# Patient Record
Sex: FEMALE | Race: WHITE
[De-identification: names, ages, dates, MRNs, and addresses within clinical notes are randomized per-mention and may not be internally consistent; named-entity substitution may affect disease eponyms.]

---

## 2017-02-15 ENCOUNTER — HOSPITAL ENCOUNTER (OUTPATIENT)
Dept: HOSPITAL 45 - C.ACU | Age: 37
Discharge: HOME | End: 2017-02-15
Attending: OBSTETRICS & GYNECOLOGY
Payer: COMMERCIAL

## 2017-02-15 DIAGNOSIS — M41.20: Primary | ICD-10-CM

## 2017-02-15 NOTE — ANESTHESIOLOGY PROGRESS NOTE
Anesthesia Progress Note


Date of Service


Feb 15, 2017.





Progress Notes


Pt seen in PAT for Exam and Advise: OB visit. Pt's EDC: 3/18/17. First 

pregnancy. PMH significant for: scoliosis, hypothyroid (found during pregnancy; 

pt refusing to take Synthroid per OB notes), hyperemesis gravidarum (on Zofran)

, GERD (on Zantac). Pt states has worn a back brace previously for scoliosis. 

No hx of surgical intervention. Pt here to discuss options for pain control 

with delivery. Options discussed including IV medications and epidural. Pt does 

have fairly significant scoliosis. D/w pt that epidural may be more difficult 

to place; possibly to use US guidance. Pt states she does have a print-out of 

spine imaging, but she is in the process of moving so she is unable to locate 

the image at this time. Advised her to bring if she is able to find.

## 2017-03-18 ENCOUNTER — HOSPITAL ENCOUNTER (INPATIENT)
Dept: HOSPITAL 45 - C.LD | Age: 37
LOS: 10 days | Discharge: HOME | End: 2017-03-28
Attending: OBSTETRICS & GYNECOLOGY | Admitting: OBSTETRICS & GYNECOLOGY
Payer: COMMERCIAL

## 2017-03-18 VITALS
HEIGHT: 65.98 IN | BODY MASS INDEX: 22.85 KG/M2 | HEIGHT: 65.98 IN | WEIGHT: 142.2 LBS | WEIGHT: 142.2 LBS | BODY MASS INDEX: 22.85 KG/M2

## 2017-03-18 DIAGNOSIS — O48.0: Primary | ICD-10-CM

## 2017-03-18 DIAGNOSIS — O61.0: ICD-10-CM

## 2017-03-18 DIAGNOSIS — K66.0: ICD-10-CM

## 2017-03-18 DIAGNOSIS — O99.613: ICD-10-CM

## 2017-03-18 DIAGNOSIS — Z3A.40: ICD-10-CM

## 2017-03-18 DIAGNOSIS — O32.3XX0: ICD-10-CM

## 2017-03-24 LAB
EOSINOPHIL NFR BLD AUTO: 214 K/UL (ref 130–400)
HCT VFR BLD CALC: 35.1 % (ref 37–47)
MCH RBC QN AUTO: 28.5 PG (ref 25–34)
MCHC RBC AUTO-ENTMCNC: 32.8 G/DL (ref 32–36)
MCV RBC AUTO: 86.9 FL (ref 80–100)
NRBC BLD AUTO-RTO: 0.3 %
PMV BLD AUTO: 9.3 FL (ref 7.4–10.4)
RBC # BLD AUTO: 4.04 M/UL (ref 4.2–5.4)
WBC # BLD AUTO: 11.09 K/UL (ref 4.8–10.8)

## 2017-03-24 NOTE — HISTORY & PHYSICAL EXAMINATION
DATE OF ADMISSION:  03/24/2017

 

HISTORY OF PRESENT ILLNESS:  The patient is a 36-year-old G1, P0, due date

03/19/2017, making her 40 weeks and 5 days today who is here for induction of

labor for postdates.  The patient's pregnancy has been unremarkable.  She was

pregnant via IVF and was seen by maternal fetal medicine because of advanced

maternal age.  On arrival to labor and delivery, she had no shortness of

breath, no chills, no fever.  Fetal heart rate is category 1.

 

PRENATAL COURSE:  Unremarkable.

 

PRENATAL LABS:  Blood type A positive, antibody negative, rubella immune, GBS

negative, RPR nonreactive.

 

PAST MEDICAL HISTORY:

1.  History of migraines.

2.  History of scoliosis.

3.  History of hypothyroidism.

 

PAST SURGICAL HISTORY:  The patient has had laparoscopy.

 

SOCIAL HISTORY:  The patient is .  Denies drug, tobacco or alcohol

use.

 

ALLERGIES:  No known drug allergies.

 

MEDICATIONS:  The patient was on Zantac, prenatal vitamins and iron

supplement.

 

PHYSICAL EXAMINATION:

GENERAL:  Well-developed, well-nourished white female in no acute distress.

HEART:  S1, S2, regular rhythm and rate.

LUNGS:  Clear to auscultation bilaterally.

ABDOMEN:  Nontender, Gravid.  Bedside ultrasound shows cephalic presentation.

PELVIC:  The patient was fingertip, 50% effaced and minus 3 on admission.

EXTREMITIES:  No cyanosis, clubbing or edema.

 

ASSESSMENT AND PLAN:  A 36-year-old G1, P0 at 40 weeks and 5 days, here for

labor induction for postdates.  The patient is admitted and will start

induction of labor.

## 2017-03-25 LAB
COMPLETE: YES
EOSINOPHIL NFR BLD AUTO: 199 K/UL (ref 130–400)
HCT VFR BLD CALC: 32.7 % (ref 37–47)
LYMPHOCYTES # BLD: 0.98 K/UL (ref 1.2–3.4)
LYMPHOCYTES NFR BLD: 6.1 %
MCH RBC QN AUTO: 28.9 PG (ref 25–34)
MCHC RBC AUTO-ENTMCNC: 33.6 G/DL (ref 32–36)
MCV RBC AUTO: 85.8 FL (ref 80–100)
META ABS #: 0.14 K/UL (ref 0–0)
METAMYELOCYTES NFR BLD: 0.9 %
MYELOCYTES NFR BLD: 0.9 %
NEUTROPHILS NFR BLD AUTO: 83.4 %
PMV BLD AUTO: 9.5 FL (ref 7.4–10.4)
RBC # BLD AUTO: 3.81 M/UL (ref 4.2–5.4)
WBC # BLD AUTO: 16.04 K/UL (ref 4.8–10.8)

## 2017-03-25 PROCEDURE — 0UN90ZZ RELEASE UTERUS, OPEN APPROACH: ICD-10-PCS | Performed by: OBSTETRICS & GYNECOLOGY

## 2017-03-25 RX ADMIN — ROPIVACAINE HYDROCHLORIDE PRN ML: 2 INJECTION, SOLUTION EPIDURAL; INFILTRATION at 10:53

## 2017-03-25 RX ADMIN — SODIUM CHLORIDE, SODIUM LACTATE, POTASSIUM CHLORIDE, AND CALCIUM CHLORIDE SCH MLS/HR: 600; 310; 30; 20 INJECTION, SOLUTION INTRAVENOUS at 03:26

## 2017-03-25 RX ADMIN — ROPIVACAINE HYDROCHLORIDE PRN ML: 2 INJECTION, SOLUTION EPIDURAL; INFILTRATION at 18:56

## 2017-03-25 RX ADMIN — SODIUM CHLORIDE, SODIUM LACTATE, POTASSIUM CHLORIDE, AND CALCIUM CHLORIDE SCH MLS/HR: 600; 310; 30; 20 INJECTION, SOLUTION INTRAVENOUS at 10:51

## 2017-03-25 RX ADMIN — OXYTOCIN SCH MLS/HR: 10 INJECTION, SOLUTION INTRAMUSCULAR; INTRAVENOUS at 22:54

## 2017-03-25 RX ADMIN — EPHEDRINE SULFATE PRN MG: 50 INJECTION INTRAVENOUS at 09:14

## 2017-03-25 RX ADMIN — EPHEDRINE SULFATE PRN MG: 50 INJECTION INTRAVENOUS at 09:21

## 2017-03-25 RX ADMIN — SODIUM CHLORIDE, SODIUM LACTATE, POTASSIUM CHLORIDE, AND CALCIUM CHLORIDE SCH MLS/HR: 600; 310; 30; 20 INJECTION, SOLUTION INTRAVENOUS at 01:42

## 2017-03-25 RX ADMIN — ROPIVACAINE HYDROCHLORIDE PRN ML: 2 INJECTION, SOLUTION EPIDURAL; INFILTRATION at 18:28

## 2017-03-25 NOTE — ANESTHESIOLOGY PROGRESS NOTE
Anesthesia Post Op Note


Date & Time


Mar 25, 2017 at 21:30





Vital Signs


Pain Intensity:  5.0





Notes


Mental Status:  alert / awake / arousable, participated in evaluation


Pt Amnestic to Procedure:  Yes


Nausea / Vomiting:  adequately controlled


Pain:  adequately controlled


Airway Patency, RR, SpO2:  stable & adequate


BP & HR:  stable & adequate


Hydration State:  stable & adequate


Anesthetic Complications:  no major complications apparent

## 2017-03-25 NOTE — [facility] POST OPERATIVE BRIEF NOTE
Immediate Operative Summary


Operative Date


Mar 25, 2017.





Pre-Operative Diagnosis





IUP; Brow Presentation; Failed induction





Post-Operative Diagnosis





Same





Procedure(s) Performed





Primary caesarean section





Delivery of live female child at 2037





Lower uterine transverse incision, lysis of filmy adhesions and repair of


bilateral broad ligaments





Surgeon


Dr. Vargas





Assistant Surgeon(s)


Dr. Guadalupe





Estimated Blood Loss


500cc





Findings


Baby 's neck hyperflexed, brow presentation, Apgars 9/9, female





Fluids (cc crystalloids)


1000 ml lr





Specimens





Cord blood





Placenta-exam





Drains


400 cc urine





Anesthesia


Spinal





Complication(s)


None





Disposition


L&D

## 2017-03-26 VITALS — HEART RATE: 85 BPM | DIASTOLIC BLOOD PRESSURE: 67 MMHG | SYSTOLIC BLOOD PRESSURE: 107 MMHG | TEMPERATURE: 98.42 F

## 2017-03-26 VITALS
SYSTOLIC BLOOD PRESSURE: 102 MMHG | DIASTOLIC BLOOD PRESSURE: 59 MMHG | OXYGEN SATURATION: 95 % | TEMPERATURE: 98.24 F | HEART RATE: 79 BPM

## 2017-03-26 VITALS — OXYGEN SATURATION: 96 %

## 2017-03-26 VITALS — OXYGEN SATURATION: 98 %

## 2017-03-26 VITALS — OXYGEN SATURATION: 93 %

## 2017-03-26 VITALS — SYSTOLIC BLOOD PRESSURE: 94 MMHG | TEMPERATURE: 99.14 F | HEART RATE: 98 BPM | DIASTOLIC BLOOD PRESSURE: 59 MMHG

## 2017-03-26 VITALS
SYSTOLIC BLOOD PRESSURE: 96 MMHG | DIASTOLIC BLOOD PRESSURE: 43 MMHG | TEMPERATURE: 98.42 F | OXYGEN SATURATION: 98 % | HEART RATE: 80 BPM

## 2017-03-26 VITALS — TEMPERATURE: 99.68 F | SYSTOLIC BLOOD PRESSURE: 105 MMHG | DIASTOLIC BLOOD PRESSURE: 68 MMHG | HEART RATE: 84 BPM

## 2017-03-26 VITALS
OXYGEN SATURATION: 97 % | HEART RATE: 88 BPM | DIASTOLIC BLOOD PRESSURE: 73 MMHG | SYSTOLIC BLOOD PRESSURE: 118 MMHG | TEMPERATURE: 98.6 F

## 2017-03-26 VITALS
DIASTOLIC BLOOD PRESSURE: 58 MMHG | HEART RATE: 93 BPM | SYSTOLIC BLOOD PRESSURE: 99 MMHG | OXYGEN SATURATION: 99 % | TEMPERATURE: 97.88 F

## 2017-03-26 VITALS — OXYGEN SATURATION: 99 %

## 2017-03-26 VITALS — OXYGEN SATURATION: 97 %

## 2017-03-26 LAB
APPEARANCE UR: CLEAR
BASOPHILS # BLD: 0.01 K/UL (ref 0–0.2)
BASOPHILS NFR BLD: 0.1 %
BILIRUB UR-MCNC: (no result) MG/DL
COLOR UR: (no result)
COMPLETE: YES
EOSINOPHIL NFR BLD AUTO: 221 K/UL (ref 130–400)
HCT VFR BLD CALC: 34.3 % (ref 37–47)
IG%: 0.9 %
IMM GRANULOCYTES NFR BLD AUTO: 6.5 %
LYMPHOCYTES # BLD: 1.03 K/UL (ref 1.2–3.4)
MANUAL MICROSCOPIC REQUIRED?: NO
MCH RBC QN AUTO: 28.4 PG (ref 25–34)
MCHC RBC AUTO-ENTMCNC: 32.9 G/DL (ref 32–36)
MCV RBC AUTO: 86.2 FL (ref 80–100)
MONOCYTES NFR BLD: 11.2 %
NEUTROPHILS # BLD AUTO: 0.1 %
NEUTROPHILS NFR BLD AUTO: 81.2 %
NITRITE UR QL STRIP: (no result)
PH UR STRIP: 5.5 [PH] (ref 4.5–7.5)
PMV BLD AUTO: 9.5 FL (ref 7.4–10.4)
RBC # BLD AUTO: 3.98 M/UL (ref 4.2–5.4)
REVIEW REQ?: NO
SP GR UR STRIP: 1.01 (ref 1–1.03)
URINE BILL WITH OR WITHOUT MIC: (no result)
UROBILINOGEN UR-MCNC: (no result) MG/DL
WBC # BLD AUTO: 15.84 K/UL (ref 4.8–10.8)
ZZURINE CULT IF INDIC CATH: YES

## 2017-03-26 RX ADMIN — Medication PRN MG: at 22:27

## 2017-03-26 RX ADMIN — ONDANSETRON PRN MG: 2 INJECTION INTRAMUSCULAR; INTRAVENOUS at 00:54

## 2017-03-26 RX ADMIN — SIMETHICONE SCH MG: 80 TABLET, CHEWABLE ORAL at 21:01

## 2017-03-26 RX ADMIN — SIMETHICONE SCH MG: 80 TABLET, CHEWABLE ORAL at 12:54

## 2017-03-26 RX ADMIN — FERROUS SULFATE TAB EC 325 MG (65 MG FE EQUIVALENT) SCH MG: 325 (65 FE) TABLET DELAYED RESPONSE at 08:00

## 2017-03-26 RX ADMIN — OXYTOCIN SCH MLS/HR: 10 INJECTION, SOLUTION INTRAMUSCULAR; INTRAVENOUS at 07:21

## 2017-03-26 RX ADMIN — Medication SCH TAB: at 08:00

## 2017-03-26 RX ADMIN — Medication PRN MG: at 17:59

## 2017-03-26 RX ADMIN — SIMETHICONE SCH MG: 80 TABLET, CHEWABLE ORAL at 17:55

## 2017-03-26 RX ADMIN — ONDANSETRON PRN MG: 2 INJECTION INTRAMUSCULAR; INTRAVENOUS at 07:20

## 2017-03-26 RX ADMIN — SIMETHICONE SCH MG: 80 TABLET, CHEWABLE ORAL at 08:52

## 2017-03-26 RX ADMIN — DOCUSATE SODIUM SCH MG: 100 CAPSULE, LIQUID FILLED ORAL at 08:52

## 2017-03-26 RX ADMIN — DOCUSATE SODIUM SCH MG: 100 CAPSULE, LIQUID FILLED ORAL at 21:01

## 2017-03-26 NOTE — OB/GYN PROGRESS NOTE
OB/GYN Progress Note


Date of Service:


Mar 26, 2017.


Patient is seen and examined.


She feels well, no complaints.


Very happy


Pain is under control with  meds.


Not OOB yet


Vomitedx2


Bleeding is minimal


No fever/ chills/ CP/ SOB/ N&V/ Leg pain


Breast feeding without problems














  Date Time  Temp Pulse Resp B/P Pulse Ox O2 Delivery O2 Flow Rate FiO2


 


3/26/17 04:30   16  98   


 


3/26/17 03:30   18  99   


 


3/26/17 03:15 36.9 80 18 96/43 98 Room Air  


 


3/26/17 02:30   18  97   


 


3/26/17 01:25   16  93   


 


3/26/17 00:30   18  97   


 


3/26/17 00:30 37.0 88 18 118/73 97 Room Air  


 


3/26/17 00:30     98 Room Air  




















 8-Hour Column   


 


 3/25/17 3/26/17 3/26/17





 16:00 00:00 08:00


 


Intake Total   806 ml


 


Output Total   550 ml


 


Balance   256 ml














 24-Hour Column 


 


 3/26/17





 08:00


 


Intake Total 806 ml


 


Output Total 550 ml


 


Balance 256 ml








 ml





Last 24 Hours








Test


  3/25/17


19:38 3/26/17


03:50 3/26/17


06:23


 


White Blood Count 16.04 K/uL   15.84 K/uL 


 


Red Blood Count 3.81 M/uL   3.98 M/uL 


 


Hemoglobin 11.0 g/dL   11.3 g/dL 


 


Hematocrit 32.7 %   34.3 % 


 


Mean Corpuscular Volume 85.8 fL   86.2 fL 


 


Mean Corpuscular Hemoglobin 28.9 pg   28.4 pg 


 


Mean Corpuscular Hemoglobin


Concent 33.6 g/dl 


  


  32.9 g/dl 


 


 


Platelet Count 199 K/uL   221 K/uL 


 


Mean Platelet Volume 9.5 fL   9.5 fL 


 


RDW Standard Deviation 59.8 fL   61.2 fL 


 


RDW Coefficient of Variation 19.4 %   19.5 % 


 


Neutrophils % (Manual) 83.4 %   


 


Lymphocytes % (Manual) 6.1 %   


 


Monocytes % (Manual) 8.7 %   


 


Metamyelocytes % 0.9 %   


 


Myelocytes % 0.9 %   


 


Neutrophils # (Manual) 13.38 K/uL   


 


Total Absolute Neutrophils 13.38 K/uL   


 


Lymphocytes # (Manual) 0.98 K/uL   


 


Total Absolute Lymphocytes 0.98 K/uL   


 


Monocytes # (Manual) 1.40 K/uL   


 


Metamyelocytes # 0.14 K/uL   


 


Myelocytes # 0.14 K/uL   


 


Red Blood Cell Morphology Unremarkable   


 


Urine Color  DK YELLOW  


 


Urine Appearance  CLEAR  


 


Urine pH  5.5  


 


Urine Specific Gravity  1.015  


 


Urine Protein  TRACE  


 


Urine Glucose (UA)  2+  


 


Urine Ketones  1+  


 


Urine Occult Blood  3+  


 


Urine Nitrite  NEG  


 


Urine Bilirubin  NEG  


 


Urine Urobilinogen  POS  


 


Urine Leukocyte Esterase  SMALL  


 


Urine WBC (Auto)  10-30 /hpf  


 


Urine RBC (Auto)  >30 /hpf  


 


Urine Hyaline Casts (Auto)  10-30 /lpf  


 


Urine Epithelial Cells (Auto)  10-20 /lpf  


 


Urine Bacteria (Auto)  NEG  


 


Neutrophils (%) (Auto)   81.2 % 


 


Lymphocytes (%) (Auto)   6.5 % 


 


Monocytes (%) (Auto)   11.2 % 


 


Eosinophils (%) (Auto)   0.1 % 


 


Basophils (%) (Auto)   0.1 % 


 


Neutrophils # (Auto)   12.86 K/uL 


 


Lymphocytes # (Auto)   1.03 K/uL 


 


Monocytes # (Auto)   1.78 K/uL 


 


Eosinophils # (Auto)   0.01 K/uL 


 


Basophils # (Auto)   0.01 K/uL 


 


Immature Granulocyte % (Auto)   0.9 % 


 


Immature Granulocyte # (Auto)   0.15 K/uL 











PE:


General: Alert, orientedx3, NAD


CVS: S1S2 RRR


Lungs; CTAB


Abd: soft, NT, fundus firm, below Umbilicus


Dressing: Clean, dry, intact


Perineum intact, Lochia rubra minimal


Ext; NT, no edema, SCD's on


Hanna: concentrated urine, clearing up from blood tinged





AP: 37 yo s/p C Section, pod# 1


VSS Afebrile doing well


Continue routine postop care


Hanna


IVF bolus


All questions were answered

## 2017-03-26 NOTE — OPERATIVE REPORT
DATE OF OPERATION:  2017

 

PREOPERATIVE DIAGNOSIS:  The patient is a 36-year-old G1, P0 at 40 weeks and

6 days of gestation, failed induction of labor, persistent brow presentation,

arrest of dilatation and prolonged spontaneous rupture of membranes.

 

POSTOPERATIVE DIAGNOSIS:  Same.

 

PROCEDURE:  Primary low transverse  with Pfannenstiel skin incision,

lysis of filmy adhesions and repair of broad ligament.

 

SURGEON:  Dr. Hartley.

 

ASSISTANT:  Dr. Guadalupe.

 

ESTIMATED BLOOD LOSS:  500.

 

FLUIDS:  1000 mL of lactated Ringer's with Pitocin.

 

DRAINS:  Hanna drained 400 mL of urine.

 

ANESTHESIA:  Spinal, Dr. Payan.

 

COMPLICATIONS:  None.

 

FINDINGS:  Baby was in cephalic presentation with brow presentation, neck 

hyperextension. A viable female infant, delivered at 2037 p.m. 

Apgars 9/9.  Weight is 3470 grams, which is 7 pound 10 ounces.

 

MATERNAL FINDINGS:  There were filmy adhesions around the anterior surface/ 
serosa of the

uterus, and there were open windows on the

broad ligaments on both sides. There were adhesions between the ovary/ 
fallopian tubes and the posterior uterine serosa. The patient has a history of 
endometriosis surgery  

Otherwise normal uterus, fallopian tubes and ovaries.



 

DESCRIPTION OF PROCEDURE:  The patient was taken to the operating room where

spinal anesthesia was given without difficulty.  She was placed in dorsal

supine position with a leftward tilt.  She was prepared and draped in the

usual sterile fashion and a Pfannenstiel skin incision was made with a

scalpel and carried through to the underlying layer of fascia with a second

scalpel.  Fascia was incised in the midline, incision extended laterally with

the help of Cherry scissors.  Lower aspect of the fascial incision was then

grasped with 2 Kocher clamps, elevated. Underlying rectus muscles were

dissected off sharply with Cherry scissors and upper aspect of the fascial

incision was grasped with 2 Kocher clamps, elevated.  Underlying rectus

muscles were dissected off bluntly and sharply with Cherry scissors.  Rectus

muscles were  in the midline, parietal peritoneum was entered

bluntly with the fingers and the peritoneal incision was extended superiorly

and inferiorly with good visualization of the bladder.  Bladder blade was

inserted.  Vesicouterine peritoneum was identified, grasped with pickups,

entered sharply with Metzenbaum scissors and a bladder flap was created

digitally and the bladder blade was reinserted, retracting the bladder down.

The lower uterine segment was incised in a transverse fashion.  Incision was

extended laterally with the help of fingers and clear amniotic fluid was

obtained and the baby's head was found to be in hyperextension position with

a brow presentation and there was a nuchal cord around the neck x1.  The head

was brought to the incision without difficulty and delivered and the cord was

reduced while delivering the shoulders.  Baby was actively crying and moving.

 Mouth and nose were suctioned.  Cord was clamped x2 and cut and baby was

handed off to the awaiting pediatrician, Dr. Schwab.  Then, cord blood was

obtained.  Placenta was delivered manually as intact and complete and the

filmy adhesions around the serosa of the uterus were reduced and uterus was

exteriorized, cleared of all clots and debris.  Uterine incision was repaired

with 0 Vicryl in a running locked fashion and a second imbricating layer was

placed with 0 Vicryl in a running locked fashion.  The lower segment was

thinned and there were small holes close to the left quadrant as well as in

the midline.  Those were repaired with 2-0 Vicryl with figure-of-eight

stitches x2 on both sides and excellent hemostasis was achieved.  The rest of

the uterine muscles and segments were intact and then there were noted to be

open windows around both sides of broad ligament, between the round ligament

and the uterus.  Those were repaired with 2-0 Vicryl in a running fashion,

bringing the broad ligament edges together to the uterine serosa and the

vesicouterine peritoneum was also reapproximated with the same suture, 2-0

Vicryl, in a running fashion.  Excellent hemostasis was achieved.  The

posterior uterus was visualized, fallopian tubes and ovaries had filmy

adhesions on the posterior uterine serosa; otherwise, normal looking ovaries

and tubes and the posterior cul-de-sac was irrigated with warm normal saline

and suctioned.  The uterus was returned to the abdomen, pelvis was irrigated

with warm normal saline and suctioned and excellent hemostasis was achieved. 

Parietal peritoneum was brought together with 3-0 Vicryl in a running

fashion.  The same suture was used to reapproximate the rectus muscles in a

running fashion and the rectus fascia was reapproximated with 0 Vicryl in a

running fashion, starting from two corners and meeting in the midline and the

subcuticular fat tissue was reapproximated with 3-0 Vicryl in a running

fashion.  Skin was closed with 4-0 Monocryl in a subcuticular fashion and

covered with Steri-Strips.  The patient tolerated the procedure well. 

Sponge, lap and needle counts were correct x3.  She was given 2 grams of

cefazolin before surgery.  She was taken to recovery room in a stable

condition.

 

 

I attest to the content of the Intraoperative Record and any orders documented 
therein. Any exceptions are noted below.

 

 

 

VERÓNICA

## 2017-03-27 VITALS
OXYGEN SATURATION: 96 % | HEART RATE: 83 BPM | SYSTOLIC BLOOD PRESSURE: 98 MMHG | DIASTOLIC BLOOD PRESSURE: 63 MMHG | TEMPERATURE: 97.34 F

## 2017-03-27 VITALS
SYSTOLIC BLOOD PRESSURE: 104 MMHG | HEART RATE: 83 BPM | TEMPERATURE: 99.14 F | OXYGEN SATURATION: 96 % | DIASTOLIC BLOOD PRESSURE: 69 MMHG

## 2017-03-27 VITALS
TEMPERATURE: 98.6 F | HEART RATE: 80 BPM | OXYGEN SATURATION: 97 % | DIASTOLIC BLOOD PRESSURE: 69 MMHG | SYSTOLIC BLOOD PRESSURE: 108 MMHG

## 2017-03-27 VITALS — TEMPERATURE: 97.52 F | DIASTOLIC BLOOD PRESSURE: 64 MMHG | SYSTOLIC BLOOD PRESSURE: 113 MMHG | HEART RATE: 79 BPM

## 2017-03-27 LAB — HCT VFR BLD CALC: 33.1 % (ref 37–47)

## 2017-03-27 RX ADMIN — Medication PRN MG: at 13:17

## 2017-03-27 RX ADMIN — DOCUSATE SODIUM SCH MG: 100 CAPSULE, LIQUID FILLED ORAL at 08:20

## 2017-03-27 RX ADMIN — FERROUS SULFATE TAB EC 325 MG (65 MG FE EQUIVALENT) SCH MG: 325 (65 FE) TABLET DELAYED RESPONSE at 08:20

## 2017-03-27 RX ADMIN — SIMETHICONE SCH MG: 80 TABLET, CHEWABLE ORAL at 08:19

## 2017-03-27 RX ADMIN — SIMETHICONE SCH MG: 80 TABLET, CHEWABLE ORAL at 12:15

## 2017-03-27 RX ADMIN — OXYCODONE HYDROCHLORIDE AND ACETAMINOPHEN PRN TAB: 5; 325 TABLET ORAL at 17:46

## 2017-03-27 RX ADMIN — DOCUSATE SODIUM SCH MG: 100 CAPSULE, LIQUID FILLED ORAL at 19:26

## 2017-03-27 RX ADMIN — OXYCODONE HYDROCHLORIDE AND ACETAMINOPHEN PRN TAB: 5; 325 TABLET ORAL at 13:17

## 2017-03-27 RX ADMIN — SIMETHICONE SCH MG: 80 TABLET, CHEWABLE ORAL at 19:26

## 2017-03-27 RX ADMIN — Medication PRN MG: at 17:46

## 2017-03-27 RX ADMIN — Medication PRN MG: at 06:07

## 2017-03-27 RX ADMIN — OXYCODONE HYDROCHLORIDE AND ACETAMINOPHEN PRN TAB: 5; 325 TABLET ORAL at 08:53

## 2017-03-27 RX ADMIN — Medication SCH TAB: at 08:20

## 2017-03-27 RX ADMIN — SIMETHICONE SCH MG: 80 TABLET, CHEWABLE ORAL at 17:46

## 2017-03-27 RX ADMIN — Medication PRN MG: at 23:18

## 2017-03-27 NOTE — SURGERY PROGRESS NOTE
Surgery Progress Note


Date of Service


Mar 27, 2017.





Subjective


Post OP Day:  2


+ ambulating, + bowel movement, + diet (Tolerating PO food and meds), + feeling 

well, + flatus, + pain controlled, No SOB, No chest pain, No complaints, No 

nausea, No using PCA, No vomiting





Objective


Vital Signs:











  Date Time  Temp Pulse Resp B/P Pulse Ox O2 Delivery O2 Flow Rate FiO2


 


3/26/17 23:25      Room Air  


 


3/26/17 23:25 37.6 84 18 105/68  Room Air  


 


3/26/17 19:00 37.3 98 20 94/59  Room Air  


 


3/26/17 15:30 36.9 85 20 107/67  Room Air  


 


3/26/17 15:30      Room Air  


 


3/26/17 12:40 36.6 93 16 99/58 99 Room Air  








General Appearance:  WD/WN, no apparent distress


Head:  normocephalic, atraumatic


Neck:  supple, no adenopathy, thyroid normal, no JVD


Respiratory/Chest:  chest non-tender, lungs clear, normal breath sounds, no 

respiratory distress, no accessory muscle use


Cardiovascular:  regular rate, rhythm, no edema, no gallop, no JVD, no murmur


Abdomen:  normal bowel sounds, non tender, non distended, soft, no organomegaly

, no pulsatile mass


Incision(s):  clean, dry, intact, no erythema, no drainage


Extremities:  normal range of motion, non-tender, normal inspection, no pedal 

edema, no calf tenderness, normal capillary refill, pelvis stable


Laboratory Results:





Results Past 24 Hours








Test


  3/27/17


07:13 Range/Units


 


 


Hemoglobin 10.7 12.0-16.0  g/dL


 


Hematocrit 33.1 37-47  %











Assessment & Plan








 regular diet








C/SEC day #2


pt doind well


anticipate disch tomorrow

## 2017-03-28 VITALS — HEART RATE: 74 BPM | TEMPERATURE: 98.42 F | DIASTOLIC BLOOD PRESSURE: 68 MMHG

## 2017-03-28 VITALS
SYSTOLIC BLOOD PRESSURE: 117 MMHG | OXYGEN SATURATION: 99 % | TEMPERATURE: 98.42 F | HEART RATE: 74 BPM | DIASTOLIC BLOOD PRESSURE: 68 MMHG

## 2017-03-28 VITALS — OXYGEN SATURATION: 99 %

## 2017-03-28 RX ADMIN — FERROUS SULFATE TAB EC 325 MG (65 MG FE EQUIVALENT) SCH MG: 325 (65 FE) TABLET DELAYED RESPONSE at 08:17

## 2017-03-28 RX ADMIN — OXYCODONE HYDROCHLORIDE AND ACETAMINOPHEN PRN TAB: 5; 325 TABLET ORAL at 10:22

## 2017-03-28 RX ADMIN — Medication SCH TAB: at 08:18

## 2017-03-28 RX ADMIN — OXYCODONE HYDROCHLORIDE AND ACETAMINOPHEN PRN TAB: 5; 325 TABLET ORAL at 08:21

## 2017-03-28 RX ADMIN — DOCUSATE SODIUM SCH MG: 100 CAPSULE, LIQUID FILLED ORAL at 08:17

## 2017-03-28 RX ADMIN — Medication PRN MG: at 08:23

## 2017-03-28 RX ADMIN — SIMETHICONE SCH MG: 80 TABLET, CHEWABLE ORAL at 08:18

## 2017-03-28 NOTE — OB/GYN PROGRESS NOTE
OB/GYN Progress Note


Date of Service


Mar 28, 2017.





Subjective


conversation w/ patient, physical exam


Ambulation:  ambulating normally


Voiding:  no voiding problems


Passing Gas:  Yes


Diet Tolerance:  Regular Diet


Lochia:  Small


Pain:  2/10


Notes:


Doing well, no concerns. Would like to go home today.





Objective


Vital Signs











  Date Time  Temp Pulse Resp B/P Pulse Ox O2 Delivery O2 Flow Rate FiO2


 


3/28/17 07:45 36.9 74 20 117/68 99 Room Air  


 


3/27/17 23:15 36.4 79 18 113/64  Room Air  


 


3/27/17 23:15      Room Air  


 


3/27/17 15:15     97 Room Air  


 


3/27/17 15:15 37.0 80 18 108/69 97 Room Air  


 


3/27/17 12:18 36.3 83  98/63 96 Room Air  


 


3/27/17 08:15 37.3 83 18 104/69 96 Room Air  


 


3/27/17 08:15     96 Room Air  











Physical Exam


General Appearance:  WELL-APPEARING


Respiratory/Chest:  chest non-tender, lungs clear


Cardiovascular:  regular rate, rhythm, no edema


Abdomen:  normal bowel sounds, soft


Fundus:  Firm


Incision Description:  Clean, Dry & Intact


Extremities:  normal range of motion, non-tender, no calf tenderness





Assessment and Plan


Post-Op


Day Number:  3


Continue Routine Care:


-D/C home today


-F/U in 1 week for incision check.

## 2017-03-28 NOTE — DISCHARGE INSTRUCTIONS
Discharge Instructions


Date of Service


Mar 28, 2017.





Admission


Reason for Admission:  Induction





Discharge


Discharge Diagnosis / Problem:  Primary  Section





Discharge Goals


Goal(s):  Routine recovery after 





Medications


Continue Dispensed Medications:  supercream, dermaplast, tucks, lansinoh





Activity Recommendations


Activity Limitations:  per Instructions/Follow-up section





.





Instructions / Follow-Up


Instructions / Follow-Up


ACTIVITY RECOMMENDATIONS:





* Gradual return to full activity over the next 2-3 weeks.


* No lifting - nothing heavier than baby over the next 2-3 weeks.


* Do not engage in vigorous exercise, sexual activity or sports until cleared 

by 


   your physician.


* Do not drive or operate any motorized equipment until cleared by your 

physician.


* You may shower/bathe daily.








BREAST CARE:





If you are not breast feeding:





*  Wear a supportive bra 24 hours a day for one to two weeks.


*  Avoid stimulating your breasts and nipples as much as possible during the


    first few weeks after delivery.


*  When taking a shower, have the warm water hit your back, not breasts.


*  When your breasts feel full, apply ice packs.  Usually three to four times 


    a day helps ease the discomfort.


*  Take a mild pain medication (Tylenol/Motrin) when you are uncomfortable.





If breast feeding:





*  Use breast milk to lubricate nipples.  Lansinoh cream may be used for sore 

nipples. 


    You do not need to remove cream prior to breast feeding.  If using a 

different 


    brand of cream, check the label for directions regarding removal of cream 

prior 


    to nursing.


*  Wear a supportive bra.


*  If having problems with breasts or breast feeding, call a lactation 

consultant or 


    your health care provider.








OVER THE COUNTER MEDICATION:





*  For discomfort or pain, you may use Acetaminophen (Tylenol), Ibuprofen (Advil

),


    or Naproxen (Aleve) following the package directions. 


*  For constipation you may use Colace following the package directions.








SPECIAL CARE INSTRUCTIONS:





When you are discharged from the hospital, it is important for you to follow 

the 


instructions listed below:





*  During the first week at home, you should be able to care for yourself and 


    your baby.  In addition, the usual light household activities are 

encouraged.





*  Limit your activities to the way you feel.  Do not try to clean the house or 


    move furniture.  Be sensible.





*  If you actively engage in sports and have done so up until the time of your 


    delivery, you may resume these activities as soon as you feel able.  This 

may


    take up to one month or even longer.  Use good judgment.





*  Continue to take your prenatal vitamins for at least six weeks after the 

birth


    of your baby.





*  Your diet need not be limited unless you were on a special diet before your 


    delivery.  Breast-feeding mothers need around 2500 calories per day and at 


    least 64-80 ounces of fluid per day (8 to 10 glasses).





*  You should eat foods from the four major food groups.  Crash diets or fad 

diets


    are to be avoided.  Eating lean meats, fresh fruits and vegetables, low-fat 


    dairy products, high fiber foods and a regular exercise program, will help 

you 


    get back to your pre-pregnancy weight without putting your health at risk.





*  Constipation is sometimes a problem after delivery.  Take a mild laxative as 


    needed.  If breast feeding, Milk of Magnesia is acceptable to use. You may


    use a suppository or Fleets enema if no episiotomy.





*  A daily shower or tub bath is suggested.  Be sure to thoroughly and gently 


    dry the perineum.





*  A bloody vaginal discharge will usually continue until around four weeks post


    partum.  A small amount of bleeding may continue for as long as six weeks.  


    Vaginal discharge changes from the bright red bleeding after delivery to 

pink


    then brownish and finally yellowish-pink before becoming white and 

disappearing.





*  Bleeding may increase with activity.  Your first period may come in 4-8 

weeks. 


    If you are breast feeding, your period may be delayed even longer.





*  [place] (sex) can begin whenever both you and your partner feel 

comfortable


    and do not have any form of genital infection.  It is recommended that you 

wait


    at least six weeks for internal and external healing to occur.  If you have 


    questions, please talk to your health care practitioner.  A condom should 

be used


    to prevent infection and pregnancy.





*  Foreplay, gentle intercourse and lubrication is very important the first 

several times


    to prevent pain.  A water-based lubricant such as K-Y jelly or Astroglide 

may be used.





*  Tampons and/or Douching should be avoided until after six weeks postpartum 

check-up.





*  If you have RH negative blood and your baby is RH positive, you will receive 

RHOGAM


    by injection prior to discharge.  The nurse will give you a card to keep 

with you that 


    has the date and place that you received RHOGAM after delivery.





*  During your prenatal care, you had a Rubella screen done to check for the 

presence of 


    rubella antibodies in your blood.  If your test was negative, you will 

receive a Rubella 


    vaccine prior to discharge.  This vaccine may cause a fever, soreness at 

the injection 


    site and flu-like symptoms.  If these symptoms persist, notify your health 

care 


    practitioner.  Pregnancy is not advised for three months after a Rubella 

vaccine.  





*  Verbalizes understanding of car seat law as reviewed with patient nursing.





*  Car Seat hand-out given and reviewed with patient by nursing.





*  Shaken baby information reviewed with patient by nursing.


 





Call you doctor if:





*  Heavy bleeding (saturating several pads an hour) or passing clots the size 

of your fist.


*  A fever >101 degrees F (38.3 degrees C) on two occasions four hours apart and

/or chills.


*  Unusual pain in the pelvic or vaginal areas.  Pain should improve each day 

postpartum.


*  Call the doctor for any increased redness, drainage or swelling around the 

incision and


    any pain unrelieved by prescribed pain medication.


*  Any signs or symptoms of phlebitis (possible blood clots forming in the veins

): leg pain,


    warm, red or swollen area on leg.


*  "Baby Blues" lasting longer than two weeks.





If you have any questions or concerns, call your health care practitioner at 


[phone].








FOLLOW-UP VISIT:





*  Incision check (staple removal) in 1 week.  Please call doctor's office at 


    [phone] to set up appointment.





*  Please call the office at [phone] to schedule a 6 week postpartum 


    examination.  It is important you keep this appointment. 





*  It is important for you to make arrangements for either yearly or twice 

yearly 


    check-ups thereafter.





Current Hospital Diet


Patient's current hospital diet: Regular OB Diet





Discharge Diet


Recommended Diet:  Regular OB Diet





Procedures


Procedures Performed:  


Primary caesarean section





Delivery of live female child at 





Lower uterine transverse incision, lysis of filmy adhesions and repair of


bilateral broad ligaments





Pending Studies


Studies pending at discharge:  no





Medical Emergencies








.


Who to Call and When:





Medical Emergencies:  If at any time you feel your situation is an emergency, 

please call 911 immediately.





.





Non-Emergent Contact


Non-Emergency issues call your:  Primary Care Provider, Gynecologist





.


.





"Provider Documentation" section prepared by Andrea Santana.





VTE Core Measure


Inpt VTE Proph given/why not?:  Treatment not indicated

## 2017-04-07 NOTE — DISCHARGE SUMMARY
DETAILS OF ADMISSION:  The patient is a 36-year-old G1, P0, at 40 weeks and 5

days of gestation, who was scheduled for induction of labor for postdates. 

On 2017, she was admitted.  She received cervical ripening and then

Pitocin.

  The baby was found to be in brow presentation at 2 cm dilatation. 

It was confirmed with bed side US where hyperextension of fetal head was noted.

She was expectantly managed to give her and baby chance for spontaneous flexion 
of head.

The cervical dilatation has not progressed and the fetal head stayed the same 
with persistent Brow presentation.

The decision was made after discussion with the mother in details to proceed 
with primary . 

 See dictated QS notes and operative

note for details.  She had primary low transverse  on 2017 and

delivered a viable female infant, Apgars 9 and 9.  See dictated operative

note for details.  In the postop period, the patient did well.  Pain was

under control with medications.  Vital signs stable, afebrile.  Urine output

was adequate.  Her H\T\H was 11.3/34.4.  Her physical exam was unremarkable. 

Her abdomen was soft, nontender.  Fundus firm below the umbilicus.  Incision

clean, dry and intact.  Bleeding was minimal.  The patient was continuously

monitored, and on postop day #2, the patient did well, ambulating, tolerating

a regular diet, passing gas.  She moved her bowels.  Vital signs stable,

afebrile.  Physical exam was unremarkable.  Incision was clean, dry and

intact.  Bleeding was minimal.  Repeat H\T\H was 10.7/33.1.  On postop day #3,

the patient was doing well, no complaints.  Pain was under control with

medications.  Physical exam was normal.  The incision was clean, dry and 
intact. 

Bleeding was minimal.  She desired to be discharged on 2017.  The

patient was discharged by Dr. Santana.  Discharge instructions were given,

when to call.  Prescriptions were written for pain.  She is to be seen in the

office for incision check in a week.  All questions were answered.

 

 

 

VERÓN[place]

## 2018-02-19 ENCOUNTER — HOSPITAL ENCOUNTER (OUTPATIENT)
Dept: HOSPITAL 45 - C.LAB | Age: 38
End: 2018-02-19
Attending: OBSTETRICS & GYNECOLOGY
Payer: COMMERCIAL

## 2018-02-19 DIAGNOSIS — Z31.41: Primary | ICD-10-CM

## 2018-03-14 ENCOUNTER — HOSPITAL ENCOUNTER (OUTPATIENT)
Dept: HOSPITAL 45 - C.LAB | Age: 38
Discharge: HOME | End: 2018-03-14
Attending: OBSTETRICS & GYNECOLOGY
Payer: COMMERCIAL

## 2018-03-14 DIAGNOSIS — N97.9: Primary | ICD-10-CM

## 2018-03-14 LAB
FOLLICLE STIMULAT HORMONE: 3.61 IU/L
LUTEINIZING HORMONE: 3.61 IU/L

## 2018-03-29 ENCOUNTER — HOSPITAL ENCOUNTER (OUTPATIENT)
Dept: HOSPITAL 45 - C.LAB | Age: 38
Discharge: HOME | End: 2018-03-29
Attending: OBSTETRICS & GYNECOLOGY
Payer: COMMERCIAL

## 2018-03-29 DIAGNOSIS — N97.8: Primary | ICD-10-CM

## 2018-04-05 ENCOUNTER — HOSPITAL ENCOUNTER (OUTPATIENT)
Dept: HOSPITAL 45 - C.LAB | Age: 38
Discharge: HOME | End: 2018-04-05
Attending: OBSTETRICS & GYNECOLOGY
Payer: COMMERCIAL

## 2018-04-05 DIAGNOSIS — N97.9: Primary | ICD-10-CM

## 2018-04-25 ENCOUNTER — HOSPITAL ENCOUNTER (OUTPATIENT)
Dept: HOSPITAL 45 - C.LAB | Age: 38
Discharge: HOME | End: 2018-04-25
Attending: OBSTETRICS & GYNECOLOGY
Payer: COMMERCIAL

## 2018-04-25 DIAGNOSIS — Z32.00: Primary | ICD-10-CM

## 2018-04-25 DIAGNOSIS — E03.9: ICD-10-CM

## 2018-04-27 NOTE — CODING QUERY NO DIAGNOSIS
TREATMENT RENDERED WITHOUT A DIAGNOSIS                                                  



To promote full compliance with coding requirements relating to patient care, physician 
participation is requested in all cases of  uncertainty.  Please assist us with 
providing a diagnosis/symptom for the test(s) below:



A diagnosis/symptom was not documented on your Order.  A valid diagnosis/symptom is 
required to bill all insurances.



**Please remember that we are unable to code a diagnosis of rule out, probable, possible, 
questionable, or suspected.  



Tests that require a diagnosis:



DOS: 3/29/18



* ESTRADIOL, ENAHNCED                           DIAGNOSIS:







Provider Signature: _____________________________ Date: _________



Thank you  

Genet Zambrano

[facility] [employer] [facility]

Phone:  [phone]

Fax:  [phone]



Once completed, please kindly fax back to [phone]



For questions please call [phone]